# Patient Record
Sex: FEMALE | Race: WHITE | NOT HISPANIC OR LATINO
[De-identification: names, ages, dates, MRNs, and addresses within clinical notes are randomized per-mention and may not be internally consistent; named-entity substitution may affect disease eponyms.]

---

## 2021-09-17 ENCOUNTER — APPOINTMENT (OUTPATIENT)
Dept: VASCULAR SURGERY | Facility: CLINIC | Age: 61
End: 2021-09-17
Payer: COMMERCIAL

## 2021-09-17 VITALS — SYSTOLIC BLOOD PRESSURE: 116 MMHG | HEART RATE: 72 BPM | DIASTOLIC BLOOD PRESSURE: 73 MMHG

## 2021-09-17 DIAGNOSIS — E11.9 TYPE 2 DIABETES MELLITUS W/OUT COMPLICATIONS: ICD-10-CM

## 2021-09-17 DIAGNOSIS — Z87.39 PERSONAL HISTORY OF OTHER DISEASES OF THE MUSCULOSKELETAL SYSTEM AND CONNECTIVE TISSUE: ICD-10-CM

## 2021-09-17 DIAGNOSIS — G62.9 POLYNEUROPATHY, UNSPECIFIED: ICD-10-CM

## 2021-09-17 DIAGNOSIS — Z87.448 PERSONAL HISTORY OF OTHER DISEASES OF URINARY SYSTEM: ICD-10-CM

## 2021-09-17 DIAGNOSIS — I10 ESSENTIAL (PRIMARY) HYPERTENSION: ICD-10-CM

## 2021-09-17 DIAGNOSIS — Z87.891 PERSONAL HISTORY OF NICOTINE DEPENDENCE: ICD-10-CM

## 2021-09-17 PROCEDURE — 99204 OFFICE O/P NEW MOD 45 MIN: CPT

## 2021-09-19 PROBLEM — E11.9 TYPE 2 DIABETES MELLITUS: Status: ACTIVE | Noted: 2021-09-17

## 2021-09-19 PROBLEM — Z87.39 HISTORY OF OSTEOPOROSIS: Status: RESOLVED | Noted: 2021-09-17 | Resolved: 2021-09-19

## 2021-09-19 PROBLEM — I10 HYPERTENSION: Status: ACTIVE | Noted: 2021-09-17

## 2021-09-19 PROBLEM — G62.9 POLYNEUROPATHY: Status: ACTIVE | Noted: 2021-09-17

## 2021-09-19 PROBLEM — Z87.448 HISTORY OF CHRONIC KIDNEY DISEASE: Status: RESOLVED | Noted: 2021-09-17 | Resolved: 2021-09-19

## 2021-09-19 PROBLEM — Z87.891 FORMER SMOKER: Status: ACTIVE | Noted: 2021-09-17

## 2021-09-19 RX ORDER — GLUCAGON 1 MG
1 VIAL (EA) INJECTION
Refills: 0 | Status: ACTIVE | COMMUNITY

## 2021-09-19 RX ORDER — ASPIRIN 81 MG/1
81 TABLET ORAL
Refills: 0 | Status: ACTIVE | COMMUNITY

## 2021-09-19 RX ORDER — SODIUM BICARBONATE 650 MG/1
650 TABLET ORAL
Refills: 0 | Status: ACTIVE | COMMUNITY

## 2021-09-19 RX ORDER — FERROUS SULFATE 325(65) MG
325 TABLET ORAL
Refills: 0 | Status: ACTIVE | COMMUNITY

## 2021-09-19 RX ORDER — ATORVASTATIN CALCIUM 10 MG/1
10 TABLET, FILM COATED ORAL
Refills: 0 | Status: ACTIVE | COMMUNITY

## 2021-09-19 RX ORDER — INSULIN LISPRO 100 [IU]/ML
100 INJECTION, SOLUTION INTRAVENOUS; SUBCUTANEOUS
Refills: 0 | Status: ACTIVE | COMMUNITY

## 2021-09-19 RX ORDER — BRIMONIDINE TARTRATE 2 MG/MG
0.2 SOLUTION/ DROPS OPHTHALMIC
Refills: 0 | Status: ACTIVE | COMMUNITY

## 2021-09-19 RX ORDER — MYCOPHENOLATE MOFETIL 500 MG/1
500 TABLET ORAL
Refills: 0 | Status: ACTIVE | COMMUNITY

## 2021-09-19 RX ORDER — MELATONIN 2.5MG/10ML
600 LIQUID (ML) ORAL
Refills: 0 | Status: ACTIVE | COMMUNITY

## 2021-09-19 RX ORDER — TACROLIMUS 1 MG/1
1 CAPSULE ORAL
Refills: 0 | Status: ACTIVE | COMMUNITY

## 2021-09-19 RX ORDER — INSULIN GLARGINE 100 [IU]/ML
100 INJECTION, SOLUTION SUBCUTANEOUS
Refills: 0 | Status: ACTIVE | COMMUNITY

## 2021-09-19 NOTE — REVIEW OF SYSTEMS
[Fever] : no fever [Chills] : no chills [Leg Claudication] : no intermittent leg claudication [Lower Ext Edema] : no lower extremity edema [Limb Weakness] : no limb weakness [Difficulty Walking] : no difficulty walking

## 2021-09-19 NOTE — ASSESSMENT
[FreeTextEntry1] : 60 yo female with vague leg pain. She underwent vascular testing at another institution and was recommended to undergo endovenous ablation of the SSV. I am uncertain if this is the etiology of her pain. I recommended that she attempt conservative therapy with compression stockings before proceeding with an invasive procedure. She was given a prescription for compression stockings. She will follow up in several months to check on the efficacy of the treatment.

## 2021-09-19 NOTE — PHYSICAL EXAM
[JVD] : no jugular venous distention  [Normal Breath Sounds] : Normal breath sounds [Normal Rate and Rhythm] : normal rate and rhythm [Ankle Swelling (On Exam)] : not present [2+] : left 2+ [Ankle Swelling Bilaterally] : bilaterally  [Varicose Veins Of Lower Extremities] : bilaterally [] : bilaterally [No Rash or Lesion] : No rash or lesion [de-identified] : No gross motor or sensory deficits [de-identified] : Awake and Alert [de-identified] : Appropriate

## 2021-09-19 NOTE — HISTORY OF PRESENT ILLNESS
[FreeTextEntry1] : 62 yo female referred by Dr. Pierre for a vascular second opinion. The patient reports that she has vague pain and cramping in her calves for many years. She reports that the cramping has improved since she received a renal transplant. She reports that she was seen by her podiatrist and underwent vascular testing. After she obtained the results of her vascular testing she was seen by a vascular surgeon. She was told she has venous insufficiency and an endovenous ablation of her SSV was recommended. She denies lower extremity edema. She has not had a trial of compression therapy. She denies pain i n her legs with ambulation.

## 2021-09-19 NOTE — DATA REVIEWED
[FreeTextEntry1] : Arterial Testing reviewed - normal ABIs with infrapopliteal disease and calcifications\par \par Venous Testing reviewed - No DVT or SVT, SVI of SSV bl

## 2021-10-01 ENCOUNTER — TRANSCRIPTION ENCOUNTER (OUTPATIENT)
Age: 61
End: 2021-10-01

## 2021-11-05 ENCOUNTER — APPOINTMENT (OUTPATIENT)
Dept: VASCULAR SURGERY | Facility: CLINIC | Age: 61
End: 2021-11-05

## 2021-11-05 ENCOUNTER — APPOINTMENT (OUTPATIENT)
Dept: HEART AND VASCULAR | Facility: CLINIC | Age: 61
End: 2021-11-05

## 2021-11-15 ENCOUNTER — NON-APPOINTMENT (OUTPATIENT)
Age: 61
End: 2021-11-15

## 2021-12-03 ENCOUNTER — APPOINTMENT (OUTPATIENT)
Dept: VASCULAR SURGERY | Facility: CLINIC | Age: 61
End: 2021-12-03
Payer: COMMERCIAL

## 2021-12-03 VITALS
TEMPERATURE: 95.18 F | HEIGHT: 58 IN | OXYGEN SATURATION: 99 % | SYSTOLIC BLOOD PRESSURE: 110 MMHG | WEIGHT: 103 LBS | RESPIRATION RATE: 16 BRPM | HEART RATE: 83 BPM | DIASTOLIC BLOOD PRESSURE: 67 MMHG | BODY MASS INDEX: 21.62 KG/M2

## 2021-12-03 PROCEDURE — 99212 OFFICE O/P EST SF 10 MIN: CPT

## 2021-12-03 NOTE — ASSESSMENT
[FreeTextEntry1] : 60 yo female with leg cramping and venous insufficiency. She has been wearing compression stockings regularly with symptomatic relief. I recommended that she continues to wear compression stockings daily. She will follow up in 6 months for repeat venous testing. She will follow up sooner if she develops a problem.

## 2021-12-03 NOTE — PHYSICAL EXAM
[JVD] : no jugular venous distention  [Normal Breath Sounds] : Normal breath sounds [Normal Rate and Rhythm] : normal rate and rhythm [2+] : left 2+ [Ankle Swelling (On Exam)] : not present [Ankle Swelling Bilaterally] : bilaterally  [Varicose Veins Of Lower Extremities] : bilaterally [] : bilaterally [No Rash or Lesion] : No rash or lesion [de-identified] : Awake and Alert [de-identified] : No gross motor or sensory deficits [de-identified] : Appropriate

## 2021-12-03 NOTE — HISTORY OF PRESENT ILLNESS
[FreeTextEntry1] : 60 yo female referred by Dr. Pierre for a vascular second opinion. The patient reports that she has vague pain and cramping in her calves for many years. She reports that the cramping has improved since she received a renal transplant. She reports that she was seen by her podiatrist and underwent vascular testing. After she obtained the results of her vascular testing she was seen by a vascular surgeon. She was told she has venous insufficiency and an endovenous ablation of her SSV was recommended. She denies lower extremity edema. She has not had a trial of compression therapy. She denies pain i n her legs with ambulation.  [de-identified] : 60 yo female previously seen for bilateral lower extremity cramping returns in follow up. She presented for a 2nd opinion. She was told that she would benefit from SSV ablations bl. I recommended that she start with conservative treatment and wear compression stockings daily. She reports that she has been wearing compression stockings regularly. She reports that the pain has resolved.

## 2022-06-17 ENCOUNTER — APPOINTMENT (OUTPATIENT)
Dept: HEART AND VASCULAR | Facility: CLINIC | Age: 62
End: 2022-06-17

## 2022-06-17 ENCOUNTER — APPOINTMENT (OUTPATIENT)
Dept: VASCULAR SURGERY | Facility: CLINIC | Age: 62
End: 2022-06-17

## 2022-06-20 ENCOUNTER — TRANSCRIPTION ENCOUNTER (OUTPATIENT)
Age: 62
End: 2022-06-20

## 2022-07-04 ENCOUNTER — TRANSCRIPTION ENCOUNTER (OUTPATIENT)
Age: 62
End: 2022-07-04

## 2022-07-15 ENCOUNTER — APPOINTMENT (OUTPATIENT)
Dept: VASCULAR SURGERY | Facility: CLINIC | Age: 62
End: 2022-07-15

## 2022-07-15 ENCOUNTER — APPOINTMENT (OUTPATIENT)
Dept: HEART AND VASCULAR | Facility: CLINIC | Age: 62
End: 2022-07-15

## 2022-08-26 ENCOUNTER — APPOINTMENT (OUTPATIENT)
Dept: DISASTER EMERGENCY | Facility: CLINIC | Age: 62
End: 2022-08-26

## 2023-05-19 ENCOUNTER — APPOINTMENT (OUTPATIENT)
Dept: HEART AND VASCULAR | Facility: CLINIC | Age: 63
End: 2023-05-19
Payer: COMMERCIAL

## 2023-05-19 PROCEDURE — 93970 EXTREMITY STUDY: CPT

## 2023-05-24 ENCOUNTER — TRANSCRIPTION ENCOUNTER (OUTPATIENT)
Age: 63
End: 2023-05-24

## 2023-10-06 ENCOUNTER — APPOINTMENT (OUTPATIENT)
Dept: VASCULAR SURGERY | Facility: CLINIC | Age: 63
End: 2023-10-06

## 2023-10-18 ENCOUNTER — APPOINTMENT (OUTPATIENT)
Dept: VASCULAR SURGERY | Facility: CLINIC | Age: 63
End: 2023-10-18
Payer: COMMERCIAL

## 2023-10-18 DIAGNOSIS — I87.2 VENOUS INSUFFICIENCY (CHRONIC) (PERIPHERAL): ICD-10-CM

## 2023-10-18 PROCEDURE — 99213 OFFICE O/P EST LOW 20 MIN: CPT | Mod: 95

## 2024-01-22 ENCOUNTER — NON-APPOINTMENT (OUTPATIENT)
Age: 64
End: 2024-01-22

## 2024-02-13 ENCOUNTER — APPOINTMENT (OUTPATIENT)
Dept: THORACIC SURGERY | Facility: CLINIC | Age: 64
End: 2024-02-13
Payer: COMMERCIAL

## 2024-02-13 VITALS — WEIGHT: 105 LBS | BODY MASS INDEX: 19.83 KG/M2 | HEIGHT: 61 IN

## 2024-02-13 DIAGNOSIS — Z87.891 PERSONAL HISTORY OF NICOTINE DEPENDENCE: ICD-10-CM

## 2024-02-13 PROCEDURE — G0296 VISIT TO DETERM LDCT ELIG: CPT

## 2024-02-13 NOTE — HISTORY OF PRESENT ILLNESS
[Former] : Former [YearQuit] : 2009 [TextBox_13] : Referred by Dr. Ignacio CARRERA had telephonic visit for a review of eligibility and discussion of the Low dose CT lung cancer screening program. A telephonic visit occurred due to the patient not having access to a smart phone or a computer for an audio/visual visit. The following was reviewed and confirmed the patient meets screening eligibility criteria. -Age 63 year Smoking Status: -Former smoker Started smoking at 18   years old. -Number of pack(s) per day: 10 cigarettes per day -Number of years smoked: 30 -Number of pack years smoking: 15  -Number of years since quitting smoking: 15 -Quit year:  2009   Ms. CARRERA denies any signs or symptoms of lung cancer including new cough, changing cough, hemoptysis, and unintentional weight loss.   Ms. CARRERA Hx DM, Covid 19 6/2022 hospitalized for 5 days,  then re- hospitalized with pneumonia later in  6/2022, right renal transplant  9/17/2019 for CKD.  She  denies HX of lung disease, heart disease, connective tissue disease, and any personal history of cancer. Reports no lung cancer in a 1st degree relative. Reports no lung cancer in a 2nd degree relative. Denies any  history of environmental and occupational exposures.    [PacksperYear] : 15

## 2024-02-13 NOTE — PLAN
[FreeTextEntry1] : TESSA CARRERA does not fit the criteria for lung cancer screening. Due to reported pack years less than 20.  Qualifications are: Age 50-80 years old (50-77 years old for Medicare) Current or former cigarette smoker of 20 or more pack years. Quit less than 15 years ago. No symptoms of lung cancer. No history of lung cancer within the last 5 years.  She was advised to discuss options with Dr. Pierre.  Pt became angry when informed she did not qualify and advised radiology appt would be cancelled. Advised it was best to cancel and reschedule if CT type changed in an effort to avoiud error. She insists scheduled radiology appt be kept.   Answered any questions and concerns she had.  VCopy of this note will be sent to Dr. Pierre

## 2024-02-13 NOTE — REASON FOR VISIT
[Home] : at home, [unfilled] , at the time of the visit. [Verbal consent obtained from patient] : the patient, [unfilled] [Other Location: e.g. Home (Enter Location, City,State)___] : at [unfilled] [Review of Eligibility] : review of eligibility [Initial Evaluation] : an initial evaluation visit [Low-Dose CT Screening Discussion] : low-dose CT lung cancer screening discussion

## 2024-06-18 ENCOUNTER — NON-APPOINTMENT (OUTPATIENT)
Age: 64
End: 2024-06-18

## 2025-06-10 ENCOUNTER — NEW PATIENT (OUTPATIENT)
Age: 65
End: 2025-06-10

## 2025-06-10 DIAGNOSIS — H34.8112: ICD-10-CM

## 2025-06-10 DIAGNOSIS — H40.1134: ICD-10-CM

## 2025-06-10 DIAGNOSIS — H04.123: ICD-10-CM

## 2025-06-10 DIAGNOSIS — E11.9: ICD-10-CM

## 2025-06-10 DIAGNOSIS — H25.813: ICD-10-CM

## 2025-06-10 PROCEDURE — 92004 COMPRE OPH EXAM NEW PT 1/>: CPT
